# Patient Record
Sex: FEMALE | Race: WHITE | Employment: PART TIME | URBAN - METROPOLITAN AREA
[De-identification: names, ages, dates, MRNs, and addresses within clinical notes are randomized per-mention and may not be internally consistent; named-entity substitution may affect disease eponyms.]

---

## 2020-06-29 ENCOUNTER — APPOINTMENT (OUTPATIENT)
Dept: GENERAL RADIOLOGY | Age: 30
End: 2020-06-29
Payer: COMMERCIAL

## 2020-06-29 ENCOUNTER — HOSPITAL ENCOUNTER (EMERGENCY)
Age: 30
Discharge: HOME | End: 2020-06-29
Payer: COMMERCIAL

## 2020-06-29 ENCOUNTER — APPOINTMENT (OUTPATIENT)
Dept: CT IMAGING | Age: 30
End: 2020-06-29
Payer: COMMERCIAL

## 2020-06-29 VITALS
TEMPERATURE: 98.2 F | HEIGHT: 59 IN | OXYGEN SATURATION: 95 % | RESPIRATION RATE: 16 BRPM | WEIGHT: 241 LBS | DIASTOLIC BLOOD PRESSURE: 76 MMHG | HEART RATE: 95 BPM | SYSTOLIC BLOOD PRESSURE: 124 MMHG | BODY MASS INDEX: 48.59 KG/M2

## 2020-06-29 PROCEDURE — 6370000000 HC RX 637 (ALT 250 FOR IP): Performed by: NURSE PRACTITIONER

## 2020-06-29 PROCEDURE — 73562 X-RAY EXAM OF KNEE 3: CPT

## 2020-06-29 PROCEDURE — 70450 CT HEAD/BRAIN W/O DYE: CPT

## 2020-06-29 PROCEDURE — 99284 EMERGENCY DEPT VISIT MOD MDM: CPT

## 2020-06-29 PROCEDURE — 72125 CT NECK SPINE W/O DYE: CPT

## 2020-06-29 RX ORDER — LOSARTAN POTASSIUM 25 MG/1
25 TABLET ORAL DAILY
COMMUNITY

## 2020-06-29 RX ORDER — ACETAMINOPHEN 500 MG
1000 TABLET ORAL ONCE
Status: COMPLETED | OUTPATIENT
Start: 2020-06-29 | End: 2020-06-29

## 2020-06-29 RX ORDER — ACETAMINOPHEN 500 MG
1000 TABLET ORAL EVERY 6 HOURS PRN
Qty: 30 TABLET | Refills: 0 | Status: SHIPPED | OUTPATIENT
Start: 2020-06-29

## 2020-06-29 RX ORDER — SPIRONOLACTONE 100 MG/1
100 TABLET, FILM COATED ORAL DAILY
COMMUNITY

## 2020-06-29 RX ADMIN — ACETAMINOPHEN 1000 MG: 500 TABLET, FILM COATED ORAL at 20:49

## 2020-06-29 ASSESSMENT — PAIN SCALES - GENERAL
PAINLEVEL_OUTOF10: 8
PAINLEVEL_OUTOF10: 8

## 2023-10-05 ENCOUNTER — OFFICE VISIT (OUTPATIENT)
Age: 33
End: 2023-10-05

## 2023-10-05 VITALS
HEIGHT: 59 IN | BODY MASS INDEX: 49.8 KG/M2 | HEART RATE: 110 BPM | SYSTOLIC BLOOD PRESSURE: 112 MMHG | TEMPERATURE: 97.9 F | OXYGEN SATURATION: 96 % | WEIGHT: 247 LBS | DIASTOLIC BLOOD PRESSURE: 79 MMHG

## 2023-10-05 DIAGNOSIS — R31.9 HEMATURIA, UNSPECIFIED TYPE: ICD-10-CM

## 2023-10-05 DIAGNOSIS — U07.1 COVID: Primary | ICD-10-CM

## 2023-10-05 DIAGNOSIS — Z91.89 AT INCREASED RISK OF EXPOSURE TO COVID-19 VIRUS: ICD-10-CM

## 2023-10-05 DIAGNOSIS — B34.9 VIRAL ILLNESS: ICD-10-CM

## 2023-10-05 DIAGNOSIS — R19.7 DIARRHEA, UNSPECIFIED TYPE: ICD-10-CM

## 2023-10-05 LAB
BILIRUBIN, POC: ABNORMAL
BLOOD URINE, POC: ABNORMAL
CLARITY, POC: CLEAR
COLOR, POC: ABNORMAL
GLUCOSE URINE, POC: ABNORMAL
KETONES, POC: ABNORMAL
LEUKOCYTE EST, POC: ABNORMAL
Lab: ABNORMAL
NITRITE, POC: ABNORMAL
PH, POC: 6
PROTEIN, POC: ABNORMAL
QC PASS/FAIL: ABNORMAL
SARS-COV-2 RDRP RESP QL NAA+PROBE: POSITIVE
SPECIFIC GRAVITY, POC: 1.03
UROBILINOGEN, POC: 0.2

## 2023-10-05 NOTE — PATIENT INSTRUCTIONS
Hydration-increasing amount of volume over time discussed and then progression of diet discussed  Go to ER if : spikes fever, if  vomiting  . if abdominal pain becomes worse-constant and focalized to one specific area of abdomen, or if notices black or bloody stools . can take OTC probiotic once a day    Keep hydrated, tylenol v (if no contraindications) as needed if pain or fever. .  follow up in 7-10 days if not better  Return sooner or go to the ER if symptoms worse/feeling worse or has new symptoms or concerns     If urine culture (-) advised she see urologist and a gynecologist for further evaluation. ..  if urine culture (+) for UTI will treat and still advised her to then repeat urine  -2 weeks later (mid cycle) to see if blood still seen in her urine

## 2023-10-05 NOTE — PROGRESS NOTES
Christy Umaña (:  1990) is a 27 y.o. female,New patient, here for evaluation of the following chief complaint(s):  Covid Testing (Covid test, cough, headache, earache and diarrhea. Started last weekend. Possible exposure at work she states. )    X 2 weeks diarrhea  ASSESSMENT/PLAN:    ICD-10-CM    1. COVID  U07.1       2. Viral illness  B34.9 POCT COVID-19 Rapid, NAAT      3. At increased risk of exposure to COVID-19 virus  Z91.89 POCT COVID-19 Rapid, NAAT      4. Diarrhea, unspecified type  R19.7 POCT Urinalysis no Micro        Results for POC orders placed in visit on 10/05/23   POCT Urinalysis no Micro   Result Value Ref Range    Color, UA yelow     Clarity, UA clear     Glucose, UA POC neg     Bilirubin, UA neg     Ketones, UA neg     Spec Grav, UA 1.030     Blood, UA POC moderate     pH, UA 6.0     Protein, UA POC neg     Urobilinogen, UA 0.2     Leukocytes, UA neg     Nitrite, UA neg       Contains abnormal data POCT COVID-19 Rapid, NAAT  Order: [id number]  Status: Final result     Visible to patient: No (not released)     Next appt: None     Dx: Viral illness; At increased risk of e. ..     0 Result Notes       Component Ref Range & Units 10/5/23 1909   SARS-COV-2, RdRp gene Negative Positive Abnormal     Lot Number  [id number]    QC Pass/Fail  pass               Specimen Collected: 10/05/23 19:09 EDT Last Resulted: 10/05/23 19:09 EDT          Patient does not appear acutely ill /no distress       Hydration-increasing amount of volume over time discussed and then progression of diet discussed  Go to ER if : spikes fever, if  vomiting  . if abdominal pain becomes worse-constant and focalized to one specific area of abdomen, or if notices black or bloody stools . can take OTC probiotic once a day    Keep hydrated, tylenol v (if no contraindications) as needed if pain or fever. .  follow up in 7-10 days if not better  Return sooner or go to the ER if symptoms worse/feeling worse or has new symptoms or

## 2023-10-07 ASSESSMENT — ENCOUNTER SYMPTOMS
NAUSEA: 0
BLOOD IN STOOL: 0
ABDOMINAL PAIN: 0
WHEEZING: 0
SHORTNESS OF BREATH: 0
SORE THROAT: 0
DIARRHEA: 1
EYES NEGATIVE: 1
SINUS PAIN: 0
RHINORRHEA: 0
STRIDOR: 0
CHEST TIGHTNESS: 0
VOMITING: 0